# Patient Record
Sex: FEMALE | URBAN - METROPOLITAN AREA
[De-identification: names, ages, dates, MRNs, and addresses within clinical notes are randomized per-mention and may not be internally consistent; named-entity substitution may affect disease eponyms.]

---

## 2017-05-22 ENCOUNTER — HOSPITAL ENCOUNTER (OUTPATIENT)
Dept: RADIOLOGY | Facility: MEDICAL CENTER | Age: 62
End: 2017-05-22
Attending: FAMILY MEDICINE
Payer: COMMERCIAL

## 2017-05-22 DIAGNOSIS — Z12.31 VISIT FOR SCREENING MAMMOGRAM: ICD-10-CM

## 2017-05-22 PROCEDURE — 77063 BREAST TOMOSYNTHESIS BI: CPT

## 2017-11-10 ENCOUNTER — HOSPITAL ENCOUNTER (OUTPATIENT)
Dept: LAB | Facility: MEDICAL CENTER | Age: 62
End: 2017-11-10
Attending: FAMILY MEDICINE
Payer: COMMERCIAL

## 2017-11-10 LAB
ALBUMIN SERPL BCP-MCNC: 3.8 G/DL (ref 3.2–4.9)
ALBUMIN/GLOB SERPL: 1.3 G/DL
ALP SERPL-CCNC: 48 U/L (ref 30–99)
ALT SERPL-CCNC: 13 U/L (ref 2–50)
ANION GAP SERPL CALC-SCNC: 8 MMOL/L (ref 0–11.9)
AST SERPL-CCNC: 14 U/L (ref 12–45)
BILIRUB SERPL-MCNC: 0.4 MG/DL (ref 0.1–1.5)
BUN SERPL-MCNC: 20 MG/DL (ref 8–22)
CALCIUM SERPL-MCNC: 9.1 MG/DL (ref 8.5–10.5)
CHLORIDE SERPL-SCNC: 103 MMOL/L (ref 96–112)
CHOLEST SERPL-MCNC: 186 MG/DL (ref 100–199)
CO2 SERPL-SCNC: 30 MMOL/L (ref 20–33)
CREAT SERPL-MCNC: 0.72 MG/DL (ref 0.5–1.4)
GFR SERPL CREATININE-BSD FRML MDRD: >60 ML/MIN/1.73 M 2
GLOBULIN SER CALC-MCNC: 3 G/DL (ref 1.9–3.5)
GLUCOSE SERPL-MCNC: 87 MG/DL (ref 65–99)
HCV AB SER QL: NEGATIVE
HDLC SERPL-MCNC: 64 MG/DL
LDLC SERPL CALC-MCNC: 100 MG/DL
POTASSIUM SERPL-SCNC: 3.7 MMOL/L (ref 3.6–5.5)
PROT SERPL-MCNC: 6.8 G/DL (ref 6–8.2)
SODIUM SERPL-SCNC: 141 MMOL/L (ref 135–145)
TRIGL SERPL-MCNC: 109 MG/DL (ref 0–149)

## 2017-11-10 PROCEDURE — 86803 HEPATITIS C AB TEST: CPT

## 2017-11-10 PROCEDURE — 80061 LIPID PANEL: CPT

## 2017-11-10 PROCEDURE — 36415 COLL VENOUS BLD VENIPUNCTURE: CPT

## 2017-11-10 PROCEDURE — 80053 COMPREHEN METABOLIC PANEL: CPT

## 2018-05-23 ENCOUNTER — HOSPITAL ENCOUNTER (OUTPATIENT)
Dept: RADIOLOGY | Facility: MEDICAL CENTER | Age: 63
End: 2018-05-23
Attending: FAMILY MEDICINE
Payer: COMMERCIAL

## 2018-05-23 DIAGNOSIS — Z12.31 ENCOUNTER FOR SCREENING MAMMOGRAM FOR MALIGNANT NEOPLASM OF BREAST: ICD-10-CM

## 2018-05-23 PROCEDURE — 77067 SCR MAMMO BI INCL CAD: CPT

## 2019-06-07 ENCOUNTER — HOSPITAL ENCOUNTER (OUTPATIENT)
Dept: RADIOLOGY | Facility: MEDICAL CENTER | Age: 64
End: 2019-06-07
Attending: FAMILY MEDICINE
Payer: COMMERCIAL

## 2019-06-07 DIAGNOSIS — Z12.31 VISIT FOR SCREENING MAMMOGRAM: ICD-10-CM

## 2019-06-07 PROCEDURE — 77063 BREAST TOMOSYNTHESIS BI: CPT

## 2019-07-01 ENCOUNTER — HOSPITAL ENCOUNTER (OUTPATIENT)
Dept: LAB | Facility: MEDICAL CENTER | Age: 64
End: 2019-07-01
Attending: FAMILY MEDICINE
Payer: COMMERCIAL

## 2019-07-01 LAB
ANION GAP SERPL CALC-SCNC: 10 MMOL/L (ref 0–11.9)
BUN SERPL-MCNC: 24 MG/DL (ref 8–22)
CALCIUM SERPL-MCNC: 9.9 MG/DL (ref 8.5–10.5)
CHLORIDE SERPL-SCNC: 104 MMOL/L (ref 96–112)
CHOLEST SERPL-MCNC: 199 MG/DL (ref 100–199)
CO2 SERPL-SCNC: 29 MMOL/L (ref 20–33)
CREAT SERPL-MCNC: 0.84 MG/DL (ref 0.5–1.4)
EST. AVERAGE GLUCOSE BLD GHB EST-MCNC: 114 MG/DL
FASTING STATUS PATIENT QL REPORTED: NORMAL
GLUCOSE SERPL-MCNC: 87 MG/DL (ref 65–99)
HBA1C MFR BLD: 5.6 % (ref 0–5.6)
HDLC SERPL-MCNC: 64 MG/DL
LDLC SERPL CALC-MCNC: 113 MG/DL
POTASSIUM SERPL-SCNC: 3.6 MMOL/L (ref 3.6–5.5)
SODIUM SERPL-SCNC: 143 MMOL/L (ref 135–145)
TRIGL SERPL-MCNC: 109 MG/DL (ref 0–149)

## 2019-07-01 PROCEDURE — 83036 HEMOGLOBIN GLYCOSYLATED A1C: CPT

## 2019-07-01 PROCEDURE — 80048 BASIC METABOLIC PNL TOTAL CA: CPT

## 2019-07-01 PROCEDURE — 36415 COLL VENOUS BLD VENIPUNCTURE: CPT

## 2019-07-01 PROCEDURE — 80061 LIPID PANEL: CPT

## 2019-07-12 ENCOUNTER — OFFICE VISIT (OUTPATIENT)
Dept: CARDIOLOGY | Facility: MEDICAL CENTER | Age: 64
End: 2019-07-12
Payer: COMMERCIAL

## 2019-07-12 ENCOUNTER — TELEPHONE (OUTPATIENT)
Dept: CARDIOLOGY | Facility: MEDICAL CENTER | Age: 64
End: 2019-07-12

## 2019-07-12 ENCOUNTER — OFFICE VISIT (OUTPATIENT)
Dept: CARDIOLOGY | Facility: MEDICAL CENTER | Age: 64
End: 2019-07-12
Attending: FAMILY MEDICINE
Payer: COMMERCIAL

## 2019-07-12 VITALS — HEART RATE: 92 BPM | SYSTOLIC BLOOD PRESSURE: 132 MMHG | DIASTOLIC BLOOD PRESSURE: 78 MMHG

## 2019-07-12 VITALS
HEIGHT: 60 IN | DIASTOLIC BLOOD PRESSURE: 75 MMHG | OXYGEN SATURATION: 95 % | BODY MASS INDEX: 26.11 KG/M2 | WEIGHT: 133 LBS | HEART RATE: 78 BPM | SYSTOLIC BLOOD PRESSURE: 144 MMHG

## 2019-07-12 DIAGNOSIS — R07.2 PRECORDIAL PAIN: ICD-10-CM

## 2019-07-12 DIAGNOSIS — R07.89 OTHER CHEST PAIN: ICD-10-CM

## 2019-07-12 DIAGNOSIS — I15.0 RENOVASCULAR HYPERTENSION: ICD-10-CM

## 2019-07-12 DIAGNOSIS — R94.31 ABNORMAL EKG: ICD-10-CM

## 2019-07-12 LAB
EKG IMPRESSION: NORMAL
TREADMILL STRESS: NORMAL

## 2019-07-12 PROCEDURE — 93000 ELECTROCARDIOGRAM COMPLETE: CPT | Performed by: INTERNAL MEDICINE

## 2019-07-12 PROCEDURE — 99244 OFF/OP CNSLTJ NEW/EST MOD 40: CPT | Performed by: INTERNAL MEDICINE

## 2019-07-12 RX ORDER — LOSARTAN POTASSIUM AND HYDROCHLOROTHIAZIDE 12.5; 1 MG/1; MG/1
1 TABLET ORAL DAILY
COMMUNITY

## 2019-07-12 ASSESSMENT — ENCOUNTER SYMPTOMS
WEIGHT LOSS: 0
HEARTBURN: 0
NERVOUS/ANXIOUS: 0
EYE PAIN: 0
BRUISES/BLEEDS EASILY: 0
ABDOMINAL PAIN: 0
DEPRESSION: 0
EYE DISCHARGE: 0
NAUSEA: 0
COUGH: 0
HEMOPTYSIS: 0
LOSS OF CONSCIOUSNESS: 0
INSOMNIA: 0
MYALGIAS: 0
VOMITING: 0
PALPITATIONS: 0
WHEEZING: 0
BLURRED VISION: 0
DIZZINESS: 0

## 2019-07-12 NOTE — PROGRESS NOTES
Chief Complaint   Patient presents with   • Chest Pain     new patient       Subjective:   Vicenta Crowley is a 64 y.o. female who presents today as a new patient.  She is sent by Dr. Ruiz in regards to her hypertension and chest discomfort    She is originally from [facility].  Her  works for information technology for the [employer].  She has been here for about 4 years, misses home.    For the last month or so she has been getting intermittent chest discomfort.  This happens invariably with exercise or ironing or even situational stressors.  She is enjoying her life and there is nothing overt going on physically or mentally.  She states her blood pressure is under good control and she is compliant on her medications    The pain she points to to the left of the sternum.  It does not radiate nor is it associated with other symptoms.  It does not interfere and she still goes walking on a daily basis  And has not gotten more intense remains about a 2 out of 10    Medical history was reviewed as above and below  History reviewed. No pertinent surgical history.  Family History   Problem Relation Age of Onset   • Heart Attack Father    • Heart Disease Father    • Heart Failure Father      Social History     Social History   • Marital status:      Spouse name: N/A   • Number of children: N/A   • Years of education: N/A     Occupational History   • Not on file.     Social History Main Topics   • Smoking status: Never Smoker   • Smokeless tobacco: Never Used   • Alcohol use Not on file   • Drug use: Unknown   • Sexual activity: Not on file     Other Topics Concern   • Not on file     Social History Narrative   • No narrative on file     No Known Allergies  Outpatient Encounter Prescriptions as of 7/12/2019   Medication Sig Dispense Refill   • losartan-hydrochlorothiazide (HYZAAR) 100-12.5 MG per tablet Take 1 Tab by mouth every day.     • Calcium-Magnesium-Vitamin D (CALCIUM 1200+D3 PO) Take  by  mouth.     • aspirin EC (ECOTRIN) 81 MG Tablet Delayed Response Take 81 mg by mouth every day.     • Cyanocobalamin (VITAMIN B 12 PO) Take  by mouth.     • diphenhydrAMINE-APAP, sleep, (TYLENOL PM EXTRA STRENGTH PO) Take  by mouth.       No facility-administered encounter medications on file as of 7/12/2019.      Review of Systems   Constitutional: Negative for malaise/fatigue and weight loss.   HENT: Negative for congestion and hearing loss.    Eyes: Negative for blurred vision, pain and discharge.   Respiratory: Negative for cough, hemoptysis and wheezing.    Cardiovascular: Negative for chest pain, palpitations and leg swelling.   Gastrointestinal: Negative for abdominal pain, heartburn, nausea and vomiting.   Musculoskeletal: Negative for joint pain and myalgias.   Skin: Negative for itching and rash.   Neurological: Negative for dizziness and loss of consciousness.   Endo/Heme/Allergies: Negative for environmental allergies. Does not bruise/bleed easily.   Psychiatric/Behavioral: Negative for depression. The patient is not nervous/anxious and does not have insomnia.    All other systems reviewed and are negative.       Objective:   132/78  92reg    Physical Exam   Constitutional: She is oriented to person, place, and time. She appears well-developed and well-nourished.   HENT:   Head: Normocephalic and atraumatic.   Mouth/Throat: No oropharyngeal exudate.   Eyes: Pupils are equal, round, and reactive to light. EOM are normal.   Neck: No JVD present. No thyromegaly present.   Cardiovascular: Normal rate, regular rhythm and intact distal pulses.    No murmur heard.  Pulmonary/Chest: Breath sounds normal. No respiratory distress. She exhibits no tenderness.   Abdominal: Bowel sounds are normal. She exhibits no distension.   Musculoskeletal: She exhibits no edema or tenderness.   Lymphadenopathy:     She has no cervical adenopathy.   Neurological: She is alert and oriented to person, place, and time. She exhibits  normal muscle tone. Coordination normal.   Skin: Skin is warm and dry. No rash noted.   Psychiatric: She has a normal mood and affect. Her behavior is normal.       Assessment:     1. Other chest pain  EKG   2. Renovascular hypertension     3. Precordial pain         Medical Decision Making:  Today's Assessment / Status / Plan:     I read her twelve-lead EKG that we did today sinus rhythm normal EKG nondiagnostic Q wave in 3    Chest discomfort  Atypical with intermediate risk for underlying coronary disease  Agree with exercise tolerance test that was ordered by her primary and getting done today  Talked at length about angiogram if high risk features such as symptoms or significant changes in her EKG with stress.  She voices understanding    Hypertension and cardiac risk  As above she has been addressing her cholesterol and blood pressure with her primary  Guidelines discussed  Think about calcium scoring which could help elaborate her need to be on a statin.  It would be required or indicated if she is high risk on her calcium score  Diet and exercise discussed    RTC based on testing

## 2019-07-12 NOTE — LETTER
[facility]   [address], [address]  [place] [address]  Phone: [phone]  Fax: [phone]              Vicenta Crowley  1955    Encounter Date: 7/12/2019    Toña Loomis M.D.          PROGRESS NOTE:  Chief Complaint   Patient presents with   • Chest Pain     new patient       Subjective:   Vicenta Crowley is a 64 y.o. female who presents today as a new patient.  She is sent by Dr. Ruiz in regards to her hypertension and chest discomfort    She is originally from [facility].  Her  works for information technology for the [employer].  She has been here for about 4 years, misses home.    For the last month or so she has been getting intermittent chest discomfort.  This happens invariably with exercise or ironing or even situational stressors.  She is enjoying her life and there is nothing overt going on physically or mentally.  She states her blood pressure is under good control and she is compliant on her medications    The pain she points to to the left of the sternum.  It does not radiate nor is it associated with other symptoms.  It does not interfere and she still goes walking on a daily basis  And has not gotten more intense remains about a 2 out of 10    Medical history was reviewed as above and below  History reviewed. No pertinent surgical history.  Family History   Problem Relation Age of Onset   • Heart Attack Father    • Heart Disease Father    • Heart Failure Father      Social History     Social History   • Marital status:      Spouse name: N/A   • Number of children: N/A   • Years of education: N/A     Occupational History   • Not on file.     Social History Main Topics   • Smoking status: Never Smoker   • Smokeless tobacco: Never Used   • Alcohol use Not on file   • Drug use: Unknown   • Sexual activity: Not on file     Other Topics Concern   • Not on file     Social History Narrative   • No narrative on file     No  Known Allergies  Outpatient Encounter Prescriptions as of 7/12/2019   Medication Sig Dispense Refill   • losartan-hydrochlorothiazide (HYZAAR) 100-12.5 MG per tablet Take 1 Tab by mouth every day.     • Calcium-Magnesium-Vitamin D (CALCIUM 1200+D3 PO) Take  by mouth.     • aspirin EC (ECOTRIN) 81 MG Tablet Delayed Response Take 81 mg by mouth every day.     • Cyanocobalamin (VITAMIN B 12 PO) Take  by mouth.     • diphenhydrAMINE-APAP, sleep, (TYLENOL PM EXTRA STRENGTH PO) Take  by mouth.       No facility-administered encounter medications on file as of 7/12/2019.      Review of Systems   Constitutional: Negative for malaise/fatigue and weight loss.   HENT: Negative for congestion and hearing loss.    Eyes: Negative for blurred vision, pain and discharge.   Respiratory: Negative for cough, hemoptysis and wheezing.    Cardiovascular: Negative for chest pain, palpitations and leg swelling.   Gastrointestinal: Negative for abdominal pain, heartburn, nausea and vomiting.   Musculoskeletal: Negative for joint pain and myalgias.   Skin: Negative for itching and rash.   Neurological: Negative for dizziness and loss of consciousness.   Endo/Heme/Allergies: Negative for environmental allergies. Does not bruise/bleed easily.   Psychiatric/Behavioral: Negative for depression. The patient is not nervous/anxious and does not have insomnia.    All other systems reviewed and are negative.       Objective:   132/78  92reg    Physical Exam   Constitutional: She is oriented to person, place, and time. She appears well-developed and well-nourished.   HENT:   Head: Normocephalic and atraumatic.   Mouth/Throat: No oropharyngeal exudate.   Eyes: Pupils are equal, round, and reactive to light. EOM are normal.   Neck: No JVD present. No thyromegaly present.   Cardiovascular: Normal rate, regular rhythm and intact distal pulses.    No murmur heard.  Pulmonary/Chest: Breath sounds normal. No respiratory distress. She exhibits no tenderness.     Abdominal: Bowel sounds are normal. She exhibits no distension.   Musculoskeletal: She exhibits no edema or tenderness.   Lymphadenopathy:     She has no cervical adenopathy.   Neurological: She is alert and oriented to person, place, and time. She exhibits normal muscle tone. Coordination normal.   Skin: Skin is warm and dry. No rash noted.   Psychiatric: She has a normal mood and affect. Her behavior is normal.       Assessment:     1. Other chest pain  EKG   2. Renovascular hypertension     3. Precordial pain         Medical Decision Making:  Today's Assessment / Status / Plan:     I read her twelve-lead EKG that we did today sinus rhythm normal EKG nondiagnostic Q wave in 3    Chest discomfort  Atypical with intermediate risk for underlying coronary disease  Agree with exercise tolerance test that was ordered by her primary and getting done today  Talked at length about angiogram if high risk features such as symptoms or significant changes in her EKG with stress.  She voices understanding    Hypertension and cardiac risk  As above she has been addressing her cholesterol and blood pressure with her primary  Guidelines discussed  Think about calcium scoring which could help elaborate her need to be on a statin.  It would be required or indicated if she is high risk on her calcium score  Diet and exercise discussed    RTC based on testing      Mya Ruiz M.D.  [address]  [place] [id number]  VIA Facsimile: [phone]

## 2019-07-12 NOTE — TELEPHONE ENCOUNTER
Toña Loomis M.D.  Carla Olvera, R.N.             Can you let her know her stress test was very good without evidence of significant coronary disease.  We did notice she has some extra beats.  These are usually benign.  PVCs.  These can cause some people to have palpitations or breathlessness.  If this is an issue please let us know we will order a 3-week monitor or talk about a medication for symptoms.     Good news      Called pt, discussed ETT per Dr Loomis and her recommendations, pt denies any symptoms at this time, she will cont to monitor and would let us know if there is any changes, pt appreciative of call and verbalizes understanding

## 2019-07-18 NOTE — PROGRESS NOTES
Indications for test: Chest pain / Abnormal EKG     Resting data:     Heart rate: 78 bpm     BP: 144/75 mmHg  Baseline EKG: Sinus Rhythm      Procedure:  Patient exercised on a Sam Protocol.    Patient exercised for 9 minutes, 15 seconds, and 10.1 METs.  100% of MPHR: 155  90% of MPHR: 140  85% of MPHR: 132  Peak Heart Rate Achieved: 145  94 % of MPHR.  Maximum /70 mmHg  Functional aerobic impairment: ( Off scale ) Active     Test was terminated due to: Fatigue     Observations/Comments: Patient tolerated test well. She denied chest pain or discomfort. Vital signs stable, oxygen saturation 96%. Frequent premature ventriclar contractions and couplets noted.     Test performed by:  Airam Al RN  7/12/2019 11:59 AM      Provider conclusions and analysis:    Good exercise tolerance  Significant PVCs, mostly monomorphic but several triplets even in recovery  No evidence of ST changes that would be suggestive of underlying flow-limiting coronary disease  No significant symptoms    Electronically Signed By:  Toña Loomis MD 7/16/19

## 2019-09-23 ENCOUNTER — HOSPITAL ENCOUNTER (OUTPATIENT)
Dept: RADIOLOGY | Facility: MEDICAL CENTER | Age: 64
End: 2019-09-23
Attending: FAMILY MEDICINE
Payer: COMMERCIAL

## 2019-09-23 DIAGNOSIS — Z13.820 OSTEOPOROSIS SCREENING: ICD-10-CM

## 2019-09-23 PROCEDURE — 77080 DXA BONE DENSITY AXIAL: CPT

## 2020-06-17 ENCOUNTER — HOSPITAL ENCOUNTER (OUTPATIENT)
Dept: RADIOLOGY | Facility: MEDICAL CENTER | Age: 65
End: 2020-06-17
Attending: FAMILY MEDICINE
Payer: COMMERCIAL

## 2020-06-17 DIAGNOSIS — Z12.31 VISIT FOR SCREENING MAMMOGRAM: ICD-10-CM

## 2020-06-17 PROCEDURE — 77067 SCR MAMMO BI INCL CAD: CPT

## 2021-03-03 DIAGNOSIS — Z23 NEED FOR VACCINATION: ICD-10-CM

## 2021-05-25 ENCOUNTER — PATIENT OUTREACH (OUTPATIENT)
Dept: SCHEDULING | Facility: IMAGING CENTER | Age: 66
End: 2021-05-25